# Patient Record
Sex: FEMALE | Race: NATIVE HAWAIIAN OR OTHER PACIFIC ISLANDER
[De-identification: names, ages, dates, MRNs, and addresses within clinical notes are randomized per-mention and may not be internally consistent; named-entity substitution may affect disease eponyms.]

---

## 2020-04-23 ENCOUNTER — HOSPITAL ENCOUNTER (EMERGENCY)
Dept: HOSPITAL 15 - ER | Age: 55
Discharge: HOME | End: 2020-04-23
Payer: COMMERCIAL

## 2020-04-23 VITALS — WEIGHT: 160 LBS | BODY MASS INDEX: 27.31 KG/M2 | HEIGHT: 64 IN

## 2020-04-23 VITALS — SYSTOLIC BLOOD PRESSURE: 146 MMHG | DIASTOLIC BLOOD PRESSURE: 76 MMHG

## 2020-04-23 DIAGNOSIS — J20.9: Primary | ICD-10-CM

## 2020-04-23 DIAGNOSIS — R03.0: ICD-10-CM

## 2020-04-23 DIAGNOSIS — F17.210: ICD-10-CM

## 2020-04-23 PROCEDURE — 96372 THER/PROPH/DIAG INJ SC/IM: CPT

## 2020-04-23 PROCEDURE — 94640 AIRWAY INHALATION TREATMENT: CPT

## 2020-04-23 PROCEDURE — 99283 EMERGENCY DEPT VISIT LOW MDM: CPT

## 2020-04-23 PROCEDURE — 71046 X-RAY EXAM CHEST 2 VIEWS: CPT

## 2021-02-20 ENCOUNTER — HOSPITAL ENCOUNTER (EMERGENCY)
Dept: HOSPITAL 15 - ER | Age: 56
Discharge: HOME | End: 2021-02-20
Payer: COMMERCIAL

## 2021-02-20 VITALS — WEIGHT: 170 LBS | HEIGHT: 64 IN | BODY MASS INDEX: 29.02 KG/M2

## 2021-02-20 VITALS — DIASTOLIC BLOOD PRESSURE: 67 MMHG | SYSTOLIC BLOOD PRESSURE: 148 MMHG

## 2021-02-20 DIAGNOSIS — E78.5: ICD-10-CM

## 2021-02-20 DIAGNOSIS — R07.89: Primary | ICD-10-CM

## 2021-02-20 DIAGNOSIS — I10: ICD-10-CM

## 2021-02-20 DIAGNOSIS — Z86.73: ICD-10-CM

## 2021-02-20 LAB
ALBUMIN SERPL-MCNC: 3.5 G/DL (ref 3.4–5)
ALP SERPL-CCNC: 83 U/L (ref 45–117)
ALT SERPL-CCNC: 25 U/L (ref 13–56)
ANION GAP SERPL CALCULATED.3IONS-SCNC: 7 MMOL/L (ref 5–15)
APTT PPP: 27.8 SEC (ref 23–31.2)
BILIRUB SERPL-MCNC: 0.3 MG/DL (ref 0.2–1)
BUN SERPL-MCNC: 13 MG/DL (ref 7–18)
BUN/CREAT SERPL: 20.3
CALCIUM SERPL-MCNC: 8.7 MG/DL (ref 8.5–10.1)
CHLORIDE SERPL-SCNC: 109 MMOL/L (ref 98–107)
CO2 SERPL-SCNC: 23 MMOL/L (ref 21–32)
GLUCOSE SERPL-MCNC: 162 MG/DL (ref 74–106)
HCT VFR BLD AUTO: 41.3 % (ref 36–46)
HGB BLD-MCNC: 14.2 G/DL (ref 12.2–16.2)
INR PPP: 0.94 (ref 0.9–1.15)
MAGNESIUM SERPL-MCNC: 2.1 MG/DL (ref 1.6–2.6)
MCH RBC QN AUTO: 29.9 PG (ref 28–32)
MCV RBC AUTO: 87.1 FL (ref 80–100)
NRBC BLD QL AUTO: 0.1 %
POTASSIUM SERPL-SCNC: 3.9 MMOL/L (ref 3.5–5.1)
PROT SERPL-MCNC: 7.4 G/DL (ref 6.4–8.2)
SODIUM SERPL-SCNC: 139 MMOL/L (ref 136–145)

## 2021-02-20 PROCEDURE — 83880 ASSAY OF NATRIURETIC PEPTIDE: CPT

## 2021-02-20 PROCEDURE — 36415 COLL VENOUS BLD VENIPUNCTURE: CPT

## 2021-02-20 PROCEDURE — 84484 ASSAY OF TROPONIN QUANT: CPT

## 2021-02-20 PROCEDURE — 85730 THROMBOPLASTIN TIME PARTIAL: CPT

## 2021-02-20 PROCEDURE — 85025 COMPLETE CBC W/AUTO DIFF WBC: CPT

## 2021-02-20 PROCEDURE — 93005 ELECTROCARDIOGRAM TRACING: CPT

## 2021-02-20 PROCEDURE — 71045 X-RAY EXAM CHEST 1 VIEW: CPT

## 2021-02-20 PROCEDURE — 83735 ASSAY OF MAGNESIUM: CPT

## 2021-02-20 PROCEDURE — 85610 PROTHROMBIN TIME: CPT

## 2021-02-20 PROCEDURE — 80053 COMPREHEN METABOLIC PANEL: CPT

## 2021-02-20 PROCEDURE — 85379 FIBRIN DEGRADATION QUANT: CPT

## 2021-02-20 PROCEDURE — 84443 ASSAY THYROID STIM HORMONE: CPT

## 2025-03-17 ENCOUNTER — HOSPITAL ENCOUNTER (INPATIENT)
Dept: HOSPITAL 15 - ER | Age: 60
LOS: 1 days | Discharge: LEFT BEFORE DISCHARGE | DRG: 249 | End: 2025-03-18
Attending: NURSE PRACTITIONER | Admitting: NURSE PRACTITIONER
Payer: MEDICAID

## 2025-03-17 VITALS
RESPIRATION RATE: 20 BRPM | SYSTOLIC BLOOD PRESSURE: 163 MMHG | TEMPERATURE: 96.8 F | OXYGEN SATURATION: 97 % | HEART RATE: 85 BPM | DIASTOLIC BLOOD PRESSURE: 79 MMHG

## 2025-03-17 VITALS — WEIGHT: 172.62 LBS | HEIGHT: 64 IN | BODY MASS INDEX: 29.47 KG/M2

## 2025-03-17 VITALS — RESPIRATION RATE: 14 BRPM | HEART RATE: 87 BPM | OXYGEN SATURATION: 96 %

## 2025-03-17 DIAGNOSIS — Z82.49: ICD-10-CM

## 2025-03-17 DIAGNOSIS — Z53.29: ICD-10-CM

## 2025-03-17 DIAGNOSIS — Z85.118: ICD-10-CM

## 2025-03-17 DIAGNOSIS — Z98.891: ICD-10-CM

## 2025-03-17 DIAGNOSIS — E87.6: ICD-10-CM

## 2025-03-17 DIAGNOSIS — I10: ICD-10-CM

## 2025-03-17 DIAGNOSIS — Z86.73: ICD-10-CM

## 2025-03-17 DIAGNOSIS — Z79.899: ICD-10-CM

## 2025-03-17 DIAGNOSIS — K52.9: Primary | ICD-10-CM

## 2025-03-17 LAB
ANION GAP SERPL CALCULATED.3IONS-SCNC: 8 MMOL/L (ref 5–15)
BUN SERPL-MCNC: 15 MG/DL (ref 9–23)
BUN/CREAT SERPL: 10.1 (ref 10–20)
CALCIUM SERPL-MCNC: 9.8 MG/DL (ref 8.7–10.4)
CHLORIDE SERPL-SCNC: 103 MMOL/L (ref 98–107)
CO2 SERPL-SCNC: 25 MMOL/L (ref 20–31)
GLUCOSE SERPL-MCNC: 253 MG/DL (ref 74–106)
HCT VFR BLD AUTO: 47.2 % (ref 36–46)
HGB BLD-MCNC: 15.8 G/DL (ref 12.2–16.2)
MCH RBC QN AUTO: 28.4 PG (ref 28–32)
MCV RBC AUTO: 84.8 FL (ref 80–100)
NRBC BLD QL AUTO: 0.2 %
POTASSIUM SERPL-SCNC: 3.3 MMOL/L (ref 3.5–5.1)
PROT UR STRIP.AUTO-MCNC: (no result) MG/DL
SODIUM SERPL-SCNC: 136 MMOL/L (ref 136–145)
YEAST # UR AUTO: (no result) /HPF

## 2025-03-17 PROCEDURE — 85025 COMPLETE CBC W/AUTO DIFF WBC: CPT

## 2025-03-17 PROCEDURE — 36415 COLL VENOUS BLD VENIPUNCTURE: CPT

## 2025-03-17 PROCEDURE — 80048 BASIC METABOLIC PNL TOTAL CA: CPT

## 2025-03-17 PROCEDURE — 81001 URINALYSIS AUTO W/SCOPE: CPT

## 2025-03-17 PROCEDURE — 74176 CT ABD & PELVIS W/O CONTRAST: CPT

## 2025-03-17 PROCEDURE — 83036 HEMOGLOBIN GLYCOSYLATED A1C: CPT

## 2025-03-17 RX ADMIN — SODIUM CHLORIDE ONE MLS/HR: 0.9 INJECTION, SOLUTION INTRAVENOUS at 18:15

## 2025-03-17 RX ADMIN — MORPHINE SULFATE ONE MG: 2 INJECTION, SOLUTION INTRAMUSCULAR; INTRAVENOUS at 17:04

## 2025-03-17 RX ADMIN — ONDANSETRON HYDROCHLORIDE ONE MG: 2 INJECTION, SOLUTION INTRAMUSCULAR; INTRAVENOUS at 17:04

## 2025-03-17 RX ADMIN — HYDROCODONE BITARTRATE AND ACETAMINOPHEN PRN TAB: 5; 325 TABLET ORAL at 23:29

## 2025-03-17 NOTE — DVH
CT abdomen and pelvis without contrast



INDICATION: colitis



TECHNIQUE: Serial axial images were performed through the abdomen and pelvis and then reformatted in 
the sagittal and coronal plane. All CT scans at this medical facility are performed using dose modula
tion techniques as appropriate to a performed exam including the following: Automated exposure contro
l was utilized; adjustment of the MA and/or KvP according to patient size; and use of iterative recon
struction technique.



FINDINGS: Liver and spleen are normal in size without focal mass. No renal masses, stones or hydronep
hrosis. No masses or enlargement of the adrenal glands or pancreas. No biliary dilatation. No gallsto
odessa. No distention of bowel loops to suggest mechanical obstruction of bowel. The appendix is normal 
in appearance. No free fluid. Within the pelvis, bladder is smooth walled without stones. No abnormal
 masses or fluid collections. 



IMPRESSION: 



1. Study is severely limited by lack of IV contrast. There is some vague edema surrounding the ascend
ing and transverse colon suggesting inflammation in this region. Recommend postcontrast CT for better
 evaluation



Computed Tomographic Radiation Dosimetry Report: Total CTDI vol = 13.89mGy Total DLP = 760.47mGy-cm L
ow dose protocols were performed.



Electronically Signed by: Moses Ruiz at 03/17/2025 16:32:43 PM

## 2025-03-17 NOTE — ED.PDOC
History of Present Illness


HPI Comments


59-year-old female came to the ER complaining of abdominal pain nausea vomiting 

diarrhea for the past two days.  She is unable to tolerate any diet.  She states

that she is feeling weak.  Does have a history of lung cancer hypertension.  She

does states the pain is 5/10 with no radiation of the abdominal pain.  Denies 

any other symptoms.


Chief Complaint:  Abdominal Pain


Time Seen by MD:  12:31


Reviewed Notes:  Nurses Notes, Medications, Allergies


Allergies:  


Coded Allergies:  


     NO KNOWN ALLERGIES (Unverified , 20)


Information Source:  Patient


Mode of Arrival:  Ambulatory


Severity:  Moderate


Timing:  Days


Duration:  Since onset





Past Medical History


PAST MEDICAL HISTORY:  High Lipids, HTN, TIA


Surgical History:  


GYN History:  Denies all GYN Hx





Family History


Family History:  Family hx of HTN





Social History


Smoker:  Non-Smoker


Alcohol:  Rarely


Drugs:  Denies Drug Use


Lives In:  Home





Constitutional:  denies: chills, diaphoresis, fatigue, fever, malaise, sweats, 

weakness, others


EENTM:  denies: blurred vision, double vision, ear bleeding, ear discharge, ear 

drainage, ear pain, ear ringing, eye pain, eye redness, hearing loss, mouth 

pain, mouth swelling, nasal discharge, nose bleeding, nose congestion, nose 

pain, photophobia, tearing, throat pain, throat swelling, voice changes, others


Respiratory:  denies: cough, hemoptysis, orthopnea, SOB at rest, shortness of 

breath, SOB with excertion, stridor, wheezing, others


Cardiovascular:  denies: chest pain, dizzy spells, diaphoresis, Dyspnea on 

exertion, edema, irregular heart beat, left arm pain, lightheadedness, 

palpitations, PND, syncope, others


Gastrointestinal:  reports: abdominal pain, diarrhea, nausea, vomiting; denies: 

abdomen distended, blood streaked bowels, constipated, dysphagia, difficulty 

swallowing, hematemesis, melena, poor appetite, poor fluid intake, rectal 

bleeding, rectal pain, others


Genitourinary:  denies: abnormal vagina bleeding, burning, dyspareunia, dysuria,

flank pain, frequency, hematuria, incontinence, pain, pregnant, vagina 

discharge, urgency, others


Neurological:  denies: dizziness, fainting, headache, left sided numbness, left 

sided weakness, numbness, paresthesia, pre-existing deficit, right sided numbnes

s, right sided weakness, seizure, speech problems, tingling, tremors, weakness, 

others


Musculoskeletal:  denies: back pain, gout, joint pain, joint swelling, muscle 

pain, muscle stiffness, neck pain, others


Integumetry:  denies: bruises, change in color, change in hair/nails, dryness, 

laceration, lesions, lumps, rash, wounds, others


Allergic/Immunocompromised:  denies: Difficulty Healing, Frequent Infections, 

Hives, Itching, others


Hematologic/Lymphatic:  denies: anemia, blood clots, easy bleeding, easy 

bruising, swollen glands, others


Endocrine:  denies: excessive hunger, excessive sweating, excessive thirst, 

excessive urination, flushing, intolerance to cold, intolerance to heat, 

unexplained weight gain, unexplained weight loss, others


Psychiatric:  denies: anxiety, bipolar disorder, depression, hopeless, panic 

disorder, schizophrenia, sleepless, suicidal, others





Physical Exam


General Appearance:  Moderate Distress


HEENT:  Normal ENT Inspection, Pharynx Normal, TMs Normal


Neck:  Full Range of Motion, Non-Tender, Normal, Normal Inspection


Respiratory:  Chest Non-Tender, Lungs Clear, No Accessory Muscle Use, No 

Respiratory Distress, Normal Breath Sounds


Cardiovascular:  No Edema, No JVD, No Murmur, No Gallop, Normal Peripheral 

Pulses, Regular Rate/Rhythm


Breast Exam:  Deferred


Gastrointestinal:  No Organomegaly, Non Tender, No Pulsatile Mass, Normal Bowel 

Sounds, Soft


Genitalia:  Deferred


Pelvic:  Deferred


Rectal:  Deferred


Extremities:  No calf tenderness, Normal capillary refill, Normal inspection, 

Normal range of motion, Non-tender, No pedal edema


Musculoskeletal :  


   Apperance:  Normal


Neurologic:  Alert, CNs II-XII nml as Tested, No Motor Deficits, Normal Affect, 

Normal Mood, No Sensory Deficits


Cerebellar Function:  Normal


Reflexes:  Normal


Skin:  Dry, Normal Color, Warm


Peripheral Pulses:  3+ Radial (R), 3+ Radial (L)


Lymphatic:  No Adenopathy





Was a procedure done?


Was a procedure done?:  No





Differential Dx


Considerations may include:


Gastroenteritis


Electrolyte imbalance





X-Ray, Labs, Meds, VS


Patient alert.  


Complaining of nausea vomiting diarrhea.  


Vitals stable.  


Answering all questions.


Was given Zofran.


Was given morphine.


Explained to the patient.  


Continue cardiac monitoring.


Time of 1ST Reevaluation:  12:39


Reevaluation 1ST:  Unchanged


Patient Education/Counseling:  Diagnosis, Treatment, Prognosis


Family Education/Counseling:  No Family Present





Departure 1


Departure


Time of Disposition:  12:39


Impression:  


   Primary Impression:  


   Acute abdominal pain


   Additional Impression:  


   Gastroenteritis


Disposition:   ADMITTED AS INPATIENT


Admit to:  Med Surg


Condition:  Guarded





Critical Care Note


Critical Care Time?:  No





Stability


Stability form required:  No





Heart Score


Heart Score:  








Heart Score Response (Comments) Value


 


History N/A 0


 


EKG N/A 0


 


Age N/A 0


 


Risk Factors N/A 0


 


Troponin N/A 0


 


Total  0

















JUAN ANTONIO SOLIS MD           Mar 17, 2025 12:40

## 2025-03-17 NOTE — DVHHP2
History of Present Illness


Reason for Visit:  Abdominal pain


History of Present Illness


59-year-old female presents for evaluation of abdominal pain.  Patient reports a

three day history of sharp epigastric abdominal pain with associated nausea, 

vomiting and several episodes of diarrhea.  Denies fever or chills.  No cardiac 

or respiratory complaints.


Past Medical History


Hypertension, dyslipidemia, TIA


Past Surgical History





Smoke:  No


ALCOHOL:  occassional


Drugs:  None


Lives:  with Family





Review of Systems


Review of Systems


Review of systems are currently negative otherwise addressed in HPI.


Allergies:  


Coded Allergies:  


     NO KNOWN ALLERGIES (Unverified , 20)


Medications





                               Current Medications








 Medications  Dose


 Ordered  Sig/Alvin


 Route  Start Time


 Stop Time Status Last Admin


Dose Admin


 


 Lisinopril  5 mg  DAILY


 PO  3/18/25 10:00


     





 


 Acetaminophen/


 Hydrocodone Bitart  1 tab  Q4HP  PRN


 PO  3/17/25 18:15


     





 


 Ondansetron HCl  4 mg  Q4HP  PRN


 IV  3/17/25 18:15


     





 


 Acetaminophen  650 mg  Q6HP  PRN


 PO  3/17/25 18:15


     














Exam


Vital Signs





Vital Signs








  Date Time  Temp Pulse Resp B/P (MAP) Pulse Ox O2 Delivery O2 Flow Rate FiO2


 


3/17/25 17:04  87 16 146/70    


 


3/17/25 16:17 98.8    96   





 98.8       








Exam


Gen:  59-year-old female in mild distress


Skin: Warm, dry, normal color and texture, no rash.


HEENT: Normocephalic atraumatic, mucous membranes moist and pink.


Neck: Cervical and supraclavicular nodes normal without enlargement, trachea is 

midline, thyroid gland is normal without masses.


Pulmonary: Clear to auscultation and percussion bilaterally.


Cardiac: Regular rate and rhythm.  No murmur


Abdomen: Soft, epigastric, nondistended, bowel sounds present all 4 quadrants, 

no guarding, no rigidity, no organomegaly.


Extremities: No cyanosis, clubbing, no edema


Neuro: Cranial nerves II through XII grossly intact, normal affect and speech, 

no focal motor deficits.





Labs/Xrays





ORDERING PHYSICIAN: JUAN ANTONIO SOLIS MD


PROCEDURE(s): ABPL - CT AB PEL WO CON-NO ORAL OR IV


REASON: colitis


ORDER NUMBER(s): [id number], ACCESSION NUMBER(s): [id number]








CT abdomen and pelvis without contrast





INDICATION: colitis





TECHNIQUE: Serial axial images were performed through the abdomen and pelvis and

then reformatted in the sagittal and coronal plane. All CT scans at this medical

facility are performed using dose modulation techniques as appropriate to a 

performed exam including the following: Automated exposure control was utilized;

adjustment of the MA and/or KvP according to patient size; and use of iterative 

reconstruction technique.





FINDINGS: Liver and spleen are normal in size without focal mass. No renal 

masses, stones or hydronephrosis. No masses or enlargement of the adrenal glands

or pancreas. No biliary dilatation. No gallstones. No distention of bowel loops 

to suggest mechanical obstruction of bowel. The appendix is normal in 

appearance. No free fluid. Within the pelvis, bladder is smooth walled without 

stones. No abnormal masses or fluid collections. 





IMPRESSION: 





1. Study is severely limited by lack of IV contrast. There is some vague edema 

surrounding the ascending and transverse colon suggesting inflammation in this 

region. Recommend postcontrast CT for better evaluation





Computed Tomographic Radiation Dosimetry Report: Total CTDI vol = 13.89mGy Total

DLP = 760.47mGy-cm Low dose protocols were performed.





Electronically Signed by: Fariha Benton at 2025 16:32:43 PM











DICTATED BY: FARIHA BENTON MD


DICTATED DATE/TIME: 25 1632





                                      Labs








Test


 3/17/25


14:35 3/17/25


12:48 Range/Units


 


 


Urine Color Yellow   Yellow  


 


Urine Clarity Turbid H  Clear  


 


Urine pH 5.5   5.0-9.0  


 


Urine Specific Gravity 1.027   1.001-1.035  


 


Urine Protein 2+ H  Negative  


 


Urine Ketones Negative   Negative  


 


Urine Blood 1+ H  Negative  /uL


 


Urine Nitrite Negative   Negative  


 


Urine Bilirubin Negative   Negative  


 


Urine Urobilinogen Normal   Negative  mg/dL


 


Urine Leukocyte Esterase Negative   Negative  /uL


 


Urine RBC 1   0 - 4  /hpf


 


Urine Microscopic WBC 5   0-5  /HPF


 


Urine Squamous Epithelial


Cells Few 


 


 <5  /hpf





 


Urine Bacteria None seen   None Seen  /hpf


 


Urine Hyaline Casts Few   0 - 2  /lpf


 


Urine Mucus Few   None Seen  


 


Urine Yeast (Budding) Occasional   None Seen  /hpf


 


Urine Glucose 3+ H  Normal  mg/dL


 


White Blood Count


 


 10.6 


 4.4-10.8


10^3/uL


 


Red Blood Count


 


 5.56 H


 4.0-5.20


10^6/uL


 


Hemoglobin  15.8  12.2-16.2  g/dL


 


Hematocrit  47.2 H 36.0-46.0  %


 


Mean Corpuscular Volume  84.8  80.0-100.0  fL


 


Mean Corpuscular Hemoglobin  28.4  28.0-32.0  pg


 


Mean Corpuscular Hemoglobin


Concent 


 33.5 


 32.0-36.0  g/dL





 


Red Cell Distribution Width  14.1  11.8-14.3  %


 


Platelet Count


 


 237 


 140-450


10^3/uL


 


Mean Platelet Volume  7.7  6.9-10.8  fL


 


Neutrophils (%) (Auto)  72.1  37.0-80.0  %


 


Lymphocytes (%) (Auto)  18.6  10.0-50.0  %


 


Monocytes (%) (Auto)  7.2  0.0-12.0  %


 


Eosinophils (%) (Auto)  1.3  0.0-7.0  %


 


Basophils (%) (Auto)  0.8  0.0-2.0  %


 


Neutrophils # (Auto)


 


 7.6 


 1.6-8.6  10


^3/uL


 


Lymphocytes # (Auto)


 


 2.0 


 0.4-5.4  10


^3/uL


 


Monocytes # (Auto)  0.8  0-1.3  10 ^3/uL


 


Eosinophils # (Auto)  0.1  0-0.8  10 ^3/uL


 


Basophils # (Auto)  0.1  0-0.2  10 ^3/uL


 


Nucleated Red Blood Cells  0.2   %


 


Sodium Level  136  136-145  mmol/L


 


Potassium Level  3.3 L 3.5-5.1  mmol/L


 


Chloride Level  103    mmol/L


 


Carbon Dioxide Level  25  20-31  mmol/L


 


Anion Gap  8  5-15  


 


Blood Urea Nitrogen  15  9-23  mg/dL


 


Creatinine


 


 1.49 H


 0.550-1.02


mg/dL


 


Glomerular Filtration Rate


Calc 


 40 


 >90  mL/min





 


BUN/Creatinine Ratio  10.1  10.0-20.0  


 


Serum Glucose  253 H   mg/dL


 


Calcium Level  9.8  8.7-10.4  mg/dL











Assessment/Plan


Assessment/Plan


Assessment


Acute abdominal pain


Gastroenteritis


Hypertension


Injury 


Hypokalemia 





Plan


Admit the patient to [facility] to the hospitalist 


Clear Liquid diet 


Replete electrolytes


Continue treatment per orders.


Plan discussed with:  Patient


My Orders





                          Orders - JUNIOR BUCIOCNP








Procedure Category Date Status





  Time 


 


Admit ADMIT 3/17/25 Transmitted





  18:12 


 


Lisinopril Tablet PHA 3/18/25 In Process





 (Zestril Tablet)  10:00 


 


Sodium Chloride 0.9% PHA 3/17/25 In Process





  18:15 


 


* [facility] On Call CONS 3/17/25 Transmitted





  18:14 


 


Basic Metabolic Panel LAB 3/18/25 Verified





  04:00 


 


Hydrocodone-Acet PHA 3/17/25 In Process





5/325mg Tab (Norco  18:15 


 


Ondansetron Hcl PHA 3/17/25 In Process





 (Zofran)  18:15 


 


Complete Blood Count LAB 3/18/25 Verified





  04:00 


 


Condition: Stable YUE 3/17/25 In Process





  18:14 


 


Acetaminophen Tablet PHA 3/17/25 In Process





 (Tylenol Tablet)  18:15 


 


Clear Liq Diet DIET 3/17/25 Transmitted





  Dinner 


 


Bedrest With Bathroom YUE 3/17/25 In Process





Privileg  18:14 


 


Stool Bacterial GERTRUDE 3/17/25 Transmitted





Culture  21:22 


 


Hemoglobin A1c LAB 3/17/25 Verified





  21:22 











Date of Service:  Mar 17, 2025


Billing Provider:  JUNIOR BUCIO


Common Visit Codes:  99223-INITIAL  INP/OBS CARE (HIGH)











JUNIOR BUCIO           Mar 17, 2025 21:25